# Patient Record
(demographics unavailable — no encounter records)

---

## 2025-05-15 NOTE — REASON FOR VISIT
[Initial Consultation] : an initial consultation visit for [Abnormal TB Test] : abnormal TB test [[employer] [facility] ] : provided by [employer]   [Time Spent: ____ minutes] : Total time spent using  services: [unfilled] minutes. The patient's primary language is not English thus required  services. [Interpreters_IDNumber] : [id number] [Interpreters_FullName] : Kaitlin

## 2025-05-15 NOTE — REVIEW OF SYSTEMS
[Nasal Stuffiness] : nasal congestion [Negative] : Cardiovascular [Negative] : Neurological [Smokers in Home] : no smokers in the home

## 2025-05-15 NOTE — PHYSICAL EXAM
[Normal] : alert, oriented as age-appropriate, affect appropriate; no weakness, no facial asymmetry, moves all extremities normal gait-child older than 18 months [de-identified] : + bilateral TMs w/ serous effusion. no erythema, purulent effusion, bulging  [de-identified] : + shotty anterior lymphadopathy, non-tender